# Patient Record
Sex: FEMALE | Race: BLACK OR AFRICAN AMERICAN | URBAN - METROPOLITAN AREA
[De-identification: names, ages, dates, MRNs, and addresses within clinical notes are randomized per-mention and may not be internally consistent; named-entity substitution may affect disease eponyms.]

---

## 2021-10-12 ENCOUNTER — OFFICE VISIT (OUTPATIENT)
Dept: URBAN - METROPOLITAN AREA CLINIC 30 | Facility: CLINIC | Age: 31
End: 2021-10-12
Payer: COMMERCIAL

## 2021-10-12 DIAGNOSIS — Z98.890 OTHER SPECIFIED POSTPROCEDURAL STATES: Primary | ICD-10-CM

## 2021-10-12 DIAGNOSIS — H51.9 DISORDER OF BINOCULAR MOVEMENT: ICD-10-CM

## 2021-10-12 DIAGNOSIS — Z83.511 FAMILY HISTORY OF GLAUCOMA: ICD-10-CM

## 2021-10-12 DIAGNOSIS — H16.223 KERATOCONJUNCTIVITIS SICCA, BILATERAL: ICD-10-CM

## 2021-10-12 PROCEDURE — 92004 COMPRE OPH EXAM NEW PT 1/>: CPT | Performed by: OPTOMETRIST

## 2021-10-12 PROCEDURE — 92133 CPTRZD OPH DX IMG PST SGM ON: CPT | Performed by: OPTOMETRIST

## 2021-10-12 ASSESSMENT — VISUAL ACUITY
OS: 20/20
OD: 20/20

## 2021-10-12 ASSESSMENT — KERATOMETRY
OD: 42.63
OS: 42.86

## 2021-10-12 ASSESSMENT — INTRAOCULAR PRESSURE
OD: 11
OS: 12

## 2021-10-12 NOTE — IMPRESSION/PLAN
Impression: Other specified postprocedural states: Z98.890. Plan: S/p Lasik OU. Flap centered and clear.

## 2021-10-12 NOTE — IMPRESSION/PLAN
Impression: Family history of glaucoma: Z83.511. Plan: [facility] + Father, + paternal grand father, + paternal grand mother, + paternal uncle. Small CDR OU. IOP low. 10/2021 RNFL and GCL WNL OU.

## 2021-10-12 NOTE — IMPRESSION/PLAN
Impression: Keratoconjunctivitis sicca, bilateral: V54.105. Plan: Pt notes straining while using the computer/ phone. Recommend AT's qid OU. Recommend O3's. Avoid ceiling fans. Using blue block glasses. Recommend blinking exercises.

## 2021-10-12 NOTE — IMPRESSION/PLAN
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 Receded to approx [address] shows 4-6 XP at near. Ortho at far. Refer for VT. May first try simple pencil push ups.